# Patient Record
Sex: MALE | Race: WHITE | URBAN - METROPOLITAN AREA
[De-identification: names, ages, dates, MRNs, and addresses within clinical notes are randomized per-mention and may not be internally consistent; named-entity substitution may affect disease eponyms.]

---

## 2017-01-01 ENCOUNTER — TELEPHONE (OUTPATIENT)
Dept: FAMILY MEDICINE | Facility: CLINIC | Age: 82
End: 2017-01-01

## 2017-01-01 ENCOUNTER — NURSE TRIAGE (OUTPATIENT)
Dept: NURSING | Facility: CLINIC | Age: 82
End: 2017-01-01

## 2017-01-01 ENCOUNTER — INFUSION THERAPY VISIT (OUTPATIENT)
Dept: INFUSION THERAPY | Facility: CLINIC | Age: 82
End: 2017-01-01
Attending: INTERNAL MEDICINE
Payer: MEDICARE

## 2017-01-01 ENCOUNTER — TELEPHONE (OUTPATIENT)
Dept: NURSING | Facility: CLINIC | Age: 82
End: 2017-01-01

## 2017-01-01 ENCOUNTER — RADIANT APPOINTMENT (OUTPATIENT)
Dept: GENERAL RADIOLOGY | Facility: CLINIC | Age: 82
End: 2017-01-01
Attending: INTERNAL MEDICINE
Payer: COMMERCIAL

## 2017-01-01 ENCOUNTER — OFFICE VISIT (OUTPATIENT)
Dept: FAMILY MEDICINE | Facility: CLINIC | Age: 82
End: 2017-01-01
Payer: COMMERCIAL

## 2017-01-01 VITALS
OXYGEN SATURATION: 98 % | DIASTOLIC BLOOD PRESSURE: 69 MMHG | HEART RATE: 70 BPM | WEIGHT: 130.8 LBS | TEMPERATURE: 97.6 F | HEIGHT: 69 IN | BODY MASS INDEX: 19.37 KG/M2 | SYSTOLIC BLOOD PRESSURE: 116 MMHG

## 2017-01-01 VITALS — TEMPERATURE: 96.8 F | SYSTOLIC BLOOD PRESSURE: 122 MMHG | HEART RATE: 70 BPM | DIASTOLIC BLOOD PRESSURE: 70 MMHG

## 2017-01-01 DIAGNOSIS — D72.829 LEUKOCYTOSIS, UNSPECIFIED TYPE: ICD-10-CM

## 2017-01-01 DIAGNOSIS — F03.90 DEMENTIA WITHOUT BEHAVIORAL DISTURBANCE, UNSPECIFIED DEMENTIA TYPE: Primary | ICD-10-CM

## 2017-01-01 DIAGNOSIS — N17.9 ACUTE KIDNEY INJURY (H): ICD-10-CM

## 2017-01-01 DIAGNOSIS — R40.0 SOMNOLENCE: ICD-10-CM

## 2017-01-01 DIAGNOSIS — F03.90 DEMENTIA WITHOUT BEHAVIORAL DISTURBANCE, UNSPECIFIED DEMENTIA TYPE: ICD-10-CM

## 2017-01-01 DIAGNOSIS — I21.4 NSTEMI (NON-ST ELEVATED MYOCARDIAL INFARCTION) (H): ICD-10-CM

## 2017-01-01 DIAGNOSIS — M62.81 GENERALIZED MUSCLE WEAKNESS: Primary | ICD-10-CM

## 2017-01-01 DIAGNOSIS — I63.30 CEREBROVASCULAR ACCIDENT (CVA) DUE TO THROMBOSIS OF CEREBRAL ARTERY (H): ICD-10-CM

## 2017-01-01 DIAGNOSIS — E46 PROTEIN-CALORIE MALNUTRITION (H): ICD-10-CM

## 2017-01-01 DIAGNOSIS — N17.9 ACUTE KIDNEY INJURY (H): Primary | ICD-10-CM

## 2017-01-01 DIAGNOSIS — R63.0 DECREASED APPETITE: ICD-10-CM

## 2017-01-01 LAB
ALBUMIN SERPL-MCNC: 2.7 G/DL (ref 3.4–5)
ALBUMIN UR-MCNC: ABNORMAL MG/DL
ALP SERPL-CCNC: 301 U/L (ref 40–150)
ALT SERPL W P-5'-P-CCNC: 84 U/L (ref 0–70)
ANION GAP SERPL CALCULATED.3IONS-SCNC: 9 MMOL/L (ref 3–14)
APPEARANCE UR: CLEAR
AST SERPL W P-5'-P-CCNC: 52 U/L (ref 0–45)
BACTERIA #/AREA URNS HPF: ABNORMAL /HPF
BASOPHILS # BLD AUTO: 0 10E9/L (ref 0–0.2)
BASOPHILS NFR BLD AUTO: 0.2 %
BILIRUB SERPL-MCNC: 0.9 MG/DL (ref 0.2–1.3)
BILIRUB UR QL STRIP: NEGATIVE
BUN SERPL-MCNC: 65 MG/DL (ref 7–30)
CALCIUM SERPL-MCNC: 8.4 MG/DL (ref 8.5–10.1)
CASTS #/AREA URNS LPF: ABNORMAL /LPF (ref 0–2)
CHLORIDE SERPL-SCNC: 105 MMOL/L (ref 94–109)
CK SERPL-CCNC: 31 U/L (ref 30–300)
CO2 SERPL-SCNC: 24 MMOL/L (ref 20–32)
COLOR UR AUTO: YELLOW
COPATH REPORT: NORMAL
CREAT SERPL-MCNC: 1.97 MG/DL (ref 0.66–1.25)
DIFFERENTIAL METHOD BLD: ABNORMAL
EOSINOPHIL # BLD AUTO: 0.1 10E9/L (ref 0–0.7)
EOSINOPHIL NFR BLD AUTO: 0.3 %
ERYTHROCYTE [DISTWIDTH] IN BLOOD BY AUTOMATED COUNT: 14.4 % (ref 10–15)
GFR SERPL CREATININE-BSD FRML MDRD: 32 ML/MIN/1.7M2
GLUCOSE SERPL-MCNC: 178 MG/DL (ref 70–99)
GLUCOSE UR STRIP-MCNC: NEGATIVE MG/DL
HCT VFR BLD AUTO: 35.2 % (ref 40–53)
HGB BLD-MCNC: 11.5 G/DL (ref 13.3–17.7)
HGB UR QL STRIP: NEGATIVE
IMM GRANULOCYTES # BLD: 0.2 10E9/L (ref 0–0.4)
IMM GRANULOCYTES NFR BLD: 0.8 %
KETONES UR STRIP-MCNC: NEGATIVE MG/DL
LEUKOCYTE ESTERASE UR QL STRIP: NEGATIVE
LYMPHOCYTES # BLD AUTO: 1 10E9/L (ref 0.8–5.3)
LYMPHOCYTES NFR BLD AUTO: 5.2 %
MCH RBC QN AUTO: 32.5 PG (ref 26.5–33)
MCHC RBC AUTO-ENTMCNC: 32.7 G/DL (ref 31.5–36.5)
MCV RBC AUTO: 99 FL (ref 78–100)
MONOCYTES # BLD AUTO: 0.8 10E9/L (ref 0–1.3)
MONOCYTES NFR BLD AUTO: 3.9 %
NEUTROPHILS # BLD AUTO: 17.5 10E9/L (ref 1.6–8.3)
NEUTROPHILS NFR BLD AUTO: 89.6 %
NITRATE UR QL: NEGATIVE
NON-SQ EPI CELLS #/AREA URNS LPF: ABNORMAL /LPF
PH UR STRIP: 5 PH (ref 5–7)
PLATELET # BLD AUTO: 349 10E9/L (ref 150–450)
POTASSIUM SERPL-SCNC: 4.7 MMOL/L (ref 3.4–5.3)
PROT SERPL-MCNC: 7.3 G/DL (ref 6.8–8.8)
RBC # BLD AUTO: 3.54 10E12/L (ref 4.4–5.9)
RBC #/AREA URNS AUTO: ABNORMAL /HPF (ref 0–2)
SODIUM SERPL-SCNC: 138 MMOL/L (ref 133–144)
SP GR UR STRIP: 1.01 (ref 1–1.03)
TSH SERPL DL<=0.005 MIU/L-ACNC: 3.79 MU/L (ref 0.4–4)
URN SPEC COLLECT METH UR: ABNORMAL
UROBILINOGEN UR STRIP-ACNC: 0.2 EU/DL (ref 0.2–1)
WBC # BLD AUTO: 19.3 10E9/L (ref 4–11)
WBC #/AREA URNS AUTO: ABNORMAL /HPF (ref 0–2)

## 2017-01-01 PROCEDURE — 25000128 H RX IP 250 OP 636: Performed by: INTERNAL MEDICINE

## 2017-01-01 PROCEDURE — 71020 XR CHEST 2 VW: CPT

## 2017-01-01 PROCEDURE — 85004 AUTOMATED DIFF WBC COUNT: CPT | Performed by: INTERNAL MEDICINE

## 2017-01-01 PROCEDURE — 99215 OFFICE O/P EST HI 40 MIN: CPT | Performed by: INTERNAL MEDICINE

## 2017-01-01 PROCEDURE — 81001 URINALYSIS AUTO W/SCOPE: CPT | Performed by: INTERNAL MEDICINE

## 2017-01-01 PROCEDURE — 82550 ASSAY OF CK (CPK): CPT | Performed by: INTERNAL MEDICINE

## 2017-01-01 PROCEDURE — 80050 GENERAL HEALTH PANEL: CPT | Performed by: INTERNAL MEDICINE

## 2017-01-01 PROCEDURE — 85060 BLOOD SMEAR INTERPRETATION: CPT | Performed by: INTERNAL MEDICINE

## 2017-01-01 PROCEDURE — 96361 HYDRATE IV INFUSION ADD-ON: CPT

## 2017-01-01 PROCEDURE — 96360 HYDRATION IV INFUSION INIT: CPT

## 2017-01-01 PROCEDURE — 36415 COLL VENOUS BLD VENIPUNCTURE: CPT | Performed by: INTERNAL MEDICINE

## 2017-01-01 RX ORDER — MIRTAZAPINE 15 MG/1
15 TABLET, FILM COATED ORAL AT BEDTIME
Qty: 30 TABLET | Refills: 3 | Status: SHIPPED | OUTPATIENT
Start: 2017-01-01

## 2017-01-01 RX ORDER — ATORVASTATIN CALCIUM 40 MG/1
TABLET, FILM COATED ORAL
Qty: 30 TABLET | Refills: 5 | Status: SHIPPED | OUTPATIENT
Start: 2017-01-01

## 2017-01-01 RX ADMIN — SODIUM CHLORIDE 1000 ML: 9 INJECTION, SOLUTION INTRAVENOUS at 13:39

## 2017-06-21 NOTE — TELEPHONE ENCOUNTER
Atorvastatin         Last Written Prescription Date: 04/07/16  Last Fill Quantity: 90, # refills: 3    Last Office Visit with [facility], P or [facility] prescribing provider:  04/07/16   Future Office Visit:      BP Readings from Last 3 Encounters:   04/07/16 157/78   03/20/16 150/75   12/30/15 185/78     Lab Results   Component Value Date    ALT 26 03/19/2016     Lab Results   Component Value Date    CHOL 213 03/20/2016     Lab Results   Component Value Date    HDL 49 03/20/2016     Lab Results   Component Value Date     03/20/2016     Lab Results   Component Value Date    TRIG 176 03/20/2016     Lab Results   Component Value Date    CHOLHDLRATIO 3.7 10/08/2012

## 2017-06-27 NOTE — TELEPHONE ENCOUNTER
"Patients Wife reports:  Patient has Alzheimer's and can not hear anymore  Patient can not come in for appt, because he is very stubborn and has had his first bath in a year yesterday \"so you know what I'm going through\"  He won't come in unless he wants to and he does not want to    Patient is out of Atorvastatin  Please advise    Routing refill request to provider for review/approval because:  Labs not current:  FLP, ALT  Patient needs to be seen because it has been more than 1 year since last office visit.                "

## 2017-06-27 NOTE — TELEPHONE ENCOUNTER
Will fill x 6 months.  The standard of care is for all patients to be seen at least 1 x year, regardless of how stubborn they are

## 2017-07-30 NOTE — TELEPHONE ENCOUNTER
Daughter, Vero, calling regarding  concerns regarding her Dad. He has had increasing weakness and poor appetite.  She is concerned about his hydration status. Advised to go to ED. Vero is interested in getting home care set up for her Dad.  Please call Vero back on Monday at [phone] to discuss this option and how to get it started.  Routed to JARRED Gómez and care team .  Mariam Golden RN   [facility]

## 2017-07-30 NOTE — TELEPHONE ENCOUNTER
"Daughter calling about her Dad, Milly, to report increased generalized weakness in the last week.  Increasingly poor appetite. Has severe dementia, and is declining, Vero, daughter, worrying about   His hydration status, questioning getting an IV in the ED. Advised per guideline to be seen tonight in ED. Daughter is concerned about him being admitted.Last time he was admitted, he didn't do well, and   She wants to avoid that. She will try and get her Dad  To the ED. Message sent to PCP and care team regarding daughters interest in home care nursing visits.   Mariam Golden RN   [facility]    Reason for Disposition    [1] MODERATE weakness (i.e., interferes with work, school, normal activities) AND [2] cause unknown  (Exceptions: weakness with acute minor illness, or weakness from poor fluid intake)    Additional Information    Negative: Severe difficulty breathing (e.g., struggling for each breath, speaks in single words)    Negative: Shock suspected (e.g., cold/pale/clammy skin, too weak to stand, low BP, rapid pulse)    Negative: Difficult to awaken or acting confused  (e.g., disoriented, slurred speech)    Negative: [1] Fainted > 15 minutes ago AND [2] still feels too weak or dizzy to stand    Negative: [1] SEVERE weakness (i.e., unable to walk or barely able to walk, requires support) AND     [2] new onset or worsening    Negative: Sounds like a life-threatening emergency to the triager    Negative: Weakness of the face, arm or leg on one side of the body    Negative: [1] Known diabetic AND [2] weakness from low blood sugar (i.e., < 60 mg/dl or 3.5 mmol/l)    Negative: Heat exhaustion suspected (i.e., dehydration from heat exposure)    Negative: Vomiting is main symptom    Negative: Diarrhea is main symptom    Negative: Difficulty breathing    Negative: Heart beating < 50 beats per minute OR > 140 beats per minute    Negative: Extra heart beats OR irregular heart beating   (i.e., \"palpitations\")    " Negative: Follows bleeding (e.g., from vomiting, rectum, vagina; EXCEPTION: small brief weakness from sight of a small amount blood)    Negative: Black or tarry bowel movements    Protocols used: WEAKNESS (GENERALIZED) AND FATIGUE-ADULT-AH

## 2017-07-31 NOTE — TELEPHONE ENCOUNTER
Message has been left for daughter, Vero to return a call to the clinic. I have advised her to follow up with a clinic visit to discuss home care.     Franci Jara RN

## 2017-08-01 NOTE — TELEPHONE ENCOUNTER
I don't see a signed release of information for daughter. He needs a clinic visit to discuss anything further for home care. He hasn't been evaluated in over a year. They didn't go to ED as suggested by FNA (at least in our system). Wife answered, he doesn't talk on the phone. She states they just want to set up home care and requests someone come to set that up. I advised he needs a clinic visit first. We scheduled this appt for tomorrow with Dr. Buchanan. I advised if they had any emergent concerns to go to the ED as suggested by FNA. She states she understands and agrees with plan.      Asuncion Hurley RN

## 2017-08-02 PROBLEM — N17.9 ACUTE KIDNEY INJURY (H): Status: ACTIVE | Noted: 2017-01-01

## 2017-08-02 NOTE — PROGRESS NOTES
SUBJECTIVE:                                                    Milly Dyson is a 89 year old male who presents to clinic today for the following health issues:      Chief Complaint   Patient presents with     Consult     patient is here with wife and daughter - both have noticed that patient has been experiencing loss of appetite, weakness, doozing during the day to be more noticable over the past week; wants to discuss possibilities of dehydration, anemia, etc.?       Milly is a 89 year old man with advanced dementia who is brought in by his wife and daughter with the concern that he has been declining rapidly over the past couple of weeks.  He has been in an slow decline for 4 years.  They are having to force him to eat, though they feel that his liquid intake has been okay.  He is sleeping a lot.  He is more weak.  They've noticed a bit of running nose and that he is clearing his throat more recently.  No other changes noted.  He lives in a one story farm home with his wife, and their daughter visits when she can to help out, but she feels it would beneficial to start home care services.  They say he has a pretty good routine set up at home.  Daughter worries about her parents' safety crossing the road to their mailbox since they both move slowly and cars come up quickly and aren't easily visible from one direction and she requests a letter to provide to the postal service to request the mailbox be moved to the same side of the road as their house- letter provided.  They report he has not been having falls.    On my interview with Milly, I asked him who was accompanying him to the appointment and he answered with his sister's name.  He does answer my ROS questions, but it appears his answers are not reliable compared to what his family members report.  He asks random questions out of the blue, such as asking if the exam table in the room was a good purchase.       He is not taking the lisinopril, Plavix, or  "lorazepam that are on his medication list.  His daughter reports that he got unsteady and dizzy on the lisinopril.  He did have an NSTEMI, but this was attributed to severe AS rather than a coronary blockage, so Plavix is stopped.  They never gave him the lorazepam at home, which I think it good to avoid.       Problem list and histories reviewed & adjusted, as indicated.  Additional history: as documented    Current Outpatient Prescriptions   Medication Sig Dispense Refill     mirtazapine (REMERON) 15 MG tablet Take 1 tablet (15 mg) by mouth At Bedtime 30 tablet 3     atorvastatin (LIPITOR) 40 MG tablet TAKE ONE TABLET BY MOUTH ONE TIME DAILY (Patient taking differently: TAKE 1/2 TABLET BY MOUTH ONE TIME DAILY) 30 tablet 5     metoprolol (LOPRESSOR) 25 MG tablet Take 1 tablet (25 mg) by mouth 2 times daily 180 tablet 3     No Known Allergies    Reviewed and updated as needed this visit by clinical staff  Tobacco  Allergies  Med Hx  Surg Hx  Fam Hx  Soc Hx      Reviewed and updated as needed this visit by Provider         ROS:  Milly reports a negative ROS (constituational, pulm, GI, ), but doesn't appear to be a reliable history.        OBJECTIVE:   /69  Pulse 70  Temp 97.6  F (36.4  C) (Tympanic)  Ht 5' 9\" (1.753 m)  Wt 130 lb 12.8 oz (59.3 kg)  SpO2 98%  BMI 19.32 kg/m2  Body mass index is 19.32 kg/(m^2).  GENERAL: healthy, alert and no distress  HENT:nose and mouth without ulcers or lesions  RESP: lungs clear to auscultation - no rales, rhonchi or wheezes  CV: regular rate and rhythm, normal S1 S2, no S3 or S4, no murmur, click or rub, no peripheral edema   ABDOMEN: soft, nontender, bowel sounds normal    Diagnostic Test Results:  Results for orders placed or performed in visit on 08/02/17 (from the past 24 hour(s))   *UA reflex to Microscopic and Culture ([facility] (except [place] and [place])   Result Value Ref Range    Color Urine Yellow     Appearance Urine Clear     " Glucose Urine Negative NEG mg/dL    Bilirubin Urine Negative NEG    Ketones Urine Negative NEG mg/dL    Specific Gravity Urine 1.015 1.003 - 1.035    Blood Urine Negative NEG    pH Urine 5.0 5.0 - 7.0 pH    Protein Albumin Urine Trace (A) NEG mg/dL    Urobilinogen Urine 0.2 0.2 - 1.0 EU/dL    Nitrite Urine Negative NEG    Leukocyte Esterase Urine Negative NEG    Source Midstream Urine    Urine Microscopic   Result Value Ref Range    WBC Urine 2-5 (A) 0 - 2 /HPF    RBC Urine O - 2 0 - 2 /HPF    Cast Urine 0-2 0 - 2 /LPF    Squamous Epithelial /LPF Urine Few FEW /LPF    Bacteria Urine Few (A) NEG /HPF   Comprehensive metabolic panel   Result Value Ref Range    Sodium 138 133 - 144 mmol/L    Potassium 4.7 3.4 - 5.3 mmol/L    Chloride 105 94 - 109 mmol/L    Carbon Dioxide 24 20 - 32 mmol/L    Anion Gap 9 3 - 14 mmol/L    Glucose 178 (H) 70 - 99 mg/dL    Urea Nitrogen 65 (H) 7 - 30 mg/dL    Creatinine 1.97 (H) 0.66 - 1.25 mg/dL    GFR Estimate 32 (L) >60 mL/min/1.7m2    GFR Estimate If Black 39 (L) >60 mL/min/1.7m2    Calcium 8.4 (L) 8.5 - 10.1 mg/dL    Bilirubin Total 0.9 0.2 - 1.3 mg/dL    Albumin 2.7 (L) 3.4 - 5.0 g/dL    Protein Total 7.3 6.8 - 8.8 g/dL    Alkaline Phosphatase 301 (H) 40 - 150 U/L    ALT 84 (H) 0 - 70 U/L    AST 52 (H) 0 - 45 U/L   CBC with platelets   Result Value Ref Range    WBC 19.3 (H) 4.0 - 11.0 10e9/L    RBC Count 3.54 (L) 4.4 - 5.9 10e12/L    Hemoglobin 11.5 (L) 13.3 - 17.7 g/dL    Hematocrit 35.2 (L) 40.0 - 53.0 %    MCV 99 78 - 100 fl    MCH 32.5 26.5 - 33.0 pg    MCHC 32.7 31.5 - 36.5 g/dL    RDW 14.4 10.0 - 15.0 %    Platelet Count 349 150 - 450 10e9/L   TSH with free T4 reflex   Result Value Ref Range    TSH 3.79 0.40 - 4.00 mU/L   CK total   Result Value Ref Range    CK Total 31 30 - 300 U/L   Differential   Result Value Ref Range    Diff Method Automated Method     % Neutrophils 89.6 %    % Lymphocytes 5.2 %    % Monocytes 3.9 %    % Eosinophils 0.3 %    % Basophils 0.2 %    % Immature  Granulocytes 0.8 %    Absolute Neutrophil 17.5 (H) 1.6 - 8.3 10e9/L    Absolute Lymphocytes 1.0 0.8 - 5.3 10e9/L    Absolute Monocytes 0.8 0.0 - 1.3 10e9/L    Absolute Eosinophils 0.1 0.0 - 0.7 10e9/L    Absolute Basophils 0.0 0.0 - 0.2 10e9/L    Abs Immature Granulocytes 0.2 0 - 0.4 10e9/L   XR Chest 2 Views    Narrative    XR CHEST 2 VW 8/2/2017 12:53 PM    HISTORY: Somnolence. Elevated white blood cell count.    COMPARISON: Several prior studies, the most recent one being dated  10/15/2012.      Impression    IMPRESSION: 2 views of the chest show no acute cardiopulmonary  disease. The patient is again shown to be status post median  sternotomy and cardiac surgery.     WANDA HOLMAN MD       ASSESSMENT/PLAN:         1. Generalized muscle weakness  2. Somnolence  3. Leukocytosis, unspecified type  4. Decreased appetite  5. Dementia without behavioral disturbance, unspecified dementia type  6. Protein-calorie malnutrition (H)  7. Acute kidney injury (H)    Milly presents with somnolence, decreased appetite, and weakness.  May be due to progression of dementia.  Labs show an elevated WBC, but I don't see clinical evidence of infection, CXR is clear, and U/A is not convincing for infection.  I am therefore suspicious of a blood malignancy- discussed this with family and we will proceed with peripheral smear for diagnosis, but they would not elect to treat a cancer if present (Milly is not decisional due to advanced dementia, but wife is primary POA and daughter is secondary).  Creatinine has increased, which may represent ALECIA due to poor PO intake, or may be progression of CKD- creatinine has not been checked for over a year, so difficult to say how acute this is.  They would like to proceed with IVF infusion to see if that makes him feel better symptomatically.  His albumin is low indicating malnutrition since he hasn't been eating well.  Recommend trial of mirtazapine for appetite stimulation.  I agree that home care  would be helpful, and have placed a referral.  Follow-up pending further results.      - Comprehensive metabolic panel  - CBC with platelets  - *UA reflex to Microscopic and Culture ([facility] (except [place] and [place])  - TSH with free T4 reflex  - CK total  - Urine Microscopic  - Differential  - XR Chest 2 Views  - Blood Morphology Pathologist Review  - mirtazapine (REMERON) 15 MG tablet; Take 1 tablet (15 mg) by mouth At Bedtime  Dispense: 30 tablet; Refill: 3  - HOME CARE NURSING REFERRAL    More than 50% of this 40 minute face-to-face appointment was spent on counseling and coordination of care of all of the above.      Bear Buchanan MD  [facility]

## 2017-08-02 NOTE — MR AVS SNAPSHOT
"              After Visit Summary   2017    Milly Dyson    MRN: [id number]           Patient Information     Date Of Birth          1926        Visit Information        Provider Department      2017 1:30 PM ROOM 3 [facility]        Today's Diagnoses     Acute kidney injury (H)    -  1       Follow-ups after your visit        Who to contact     If you have questions or need follow up information about today's clinic visit or your schedule please contact [facility] directly at [phone].  Normal or non-critical lab and imaging results will be communicated to you by [employer], letter or phone within 4 business days after the clinic has received the results. If you do not hear from us within 7 days, please contact the clinic through [employer] or phone. If you have a critical or abnormal lab result, we will notify you by phone as soon as possible.  Submit refill requests through [employer] or call your pharmacy and they will forward the refill request to us. Please allow 3 business days for your refill to be completed.          Additional Information About Your Visit        MyChart Information     [employer] lets you send messages to your doctor, view your test results, renew your prescriptions, schedule appointments and more. To sign up, go to [email or web address] . Click on \"Log in\" on the left side of the screen, which will take you to the Welcome page. Then click on \"Sign up Now\" on the right side of the page.     You will be asked to enter the access code listed below, as well as some personal information. Please follow the directions to create your username and password.     Your access code is: [email or web address]  Expires: 10/31/2017  1:07 PM     Your access code will  in 90 days. If you need help or a new code, please call your [facility] clinic or [phone].        Care EveryWhere ID     This is your Care EveryWhere ID. This could be used by other organizations to access " your [facility] medical records  [id number]        Your Vitals Were     Pulse Temperature                70 96.8  F (36  C)           Blood Pressure from Last 3 Encounters:   08/02/17 122/70   08/02/17 116/69   04/07/16 157/78    Weight from Last 3 Encounters:   08/02/17 59.3 kg (130 lb 12.8 oz)   04/07/16 62.1 kg (137 lb)   03/20/16 60.2 kg (132 lb 11.5 oz)              Today, you had the following     No orders found for display         Today's Medication Changes          These changes are accurate as of: 8/2/17  3:34 PM.  If you have any questions, ask your nurse or doctor.               Start taking these medicines.        Dose/Directions    mirtazapine 15 MG tablet   Commonly known as:  REMERON   Used for:  Decreased appetite   Started by:  Bear Buchanan MD        Dose:  15 mg   Take 1 tablet (15 mg) by mouth At Bedtime   Quantity:  30 tablet   Refills:  3         These medicines have changed or have updated prescriptions.        Dose/Directions    atorvastatin 40 MG tablet   Commonly known as:  LIPITOR   This may have changed:  See the new instructions.   Used for:  Cerebrovascular accident (CVA) due to thrombosis of cerebral artery (H), NSTEMI (non-ST elevated myocardial infarction) (H)        TAKE ONE TABLET BY MOUTH ONE TIME DAILY   Quantity:  30 tablet   Refills:  5         Stop taking these medicines if you haven't already. Please contact your care team if you have questions.     clopidogrel 75 MG tablet   Commonly known as:  PLAVIX   Stopped by:  Bear Buchanan MD           lisinopril 10 MG tablet   Commonly known as:  PRINIVIL/ZESTRIL   Stopped by:  Bear Buchanan MD           LORazepam 0.5 MG tablet   Commonly known as:  ATIVAN   Stopped by:  Bear Buchanan MD                Where to get your medicines      These medications were sent to [facility] [place], [place] - [address]  [address] [address]     Phone:  [phone]     mirtazapine 15 MG tablet                 Primary Care Provider Office Phone # Fax #    Shavon Gómez [phone]       [facility] [address] [address]        Equal Access to Services     RICHIE GUERRIER : Hadii aad ku hadmaudeo Sorekhaali, waaxda luqadaha, qaybta kaalmada adeegyada, ronni campbellrandy salterashish salmon mariam ashford. So [facility] [phone].    ATENCIÓN: Si habla español, tiene a yepez disposición servicios gratuitos de asistencia lingüística. Llame al [phone].    We comply with applicable federal civil rights laws and [place] laws. We do not discriminate on the basis of race, color, national origin, age, disability sex, sexual orientation or gender identity.            Thank you!     Thank you for choosing [facility]  for your care. Our goal is always to provide you with excellent care. Hearing back from our patients is one way we can continue to improve our services. Please take a few minutes to complete the written survey that you may receive in the mail after your visit with us. Thank you!             Your Updated Medication List - Protect others around you: Learn how to safely use, store and throw away your medicines at www.disposemymeds.org.          This list is accurate as of: 8/2/17  3:34 PM.  Always use your most recent med list.                   Brand Name Dispense Instructions for use Diagnosis    atorvastatin 40 MG tablet    LIPITOR    30 tablet    TAKE ONE TABLET BY MOUTH ONE TIME DAILY    Cerebrovascular accident (CVA) due to thrombosis of cerebral artery (H), NSTEMI (non-ST elevated myocardial infarction) (H)       metoprolol 25 MG tablet    LOPRESSOR    180 tablet    Take 1 tablet (25 mg) by mouth 2 times daily    S/P AVR (aortic valve replacement), NSTEMI (non-ST elevated myocardial infarction) (H)       mirtazapine 15 MG tablet    REMERON    30 tablet    Take 1 tablet (15 mg) by mouth At Bedtime    Decreased appetite

## 2017-08-02 NOTE — LETTER
[facility]  [address] [address]  Phone: [phone]    August 2, 2017        Milly Dyson  [address] [place] [address]             Post master of [place],       Please consider moving the Kiel's mailbox to the same side of road as their house rather than across the street. They are physically slow, elderly and need it moved for safety reason since they are having trouble crossing the road.      Sincerely,        Bear Buchanan MD

## 2017-08-02 NOTE — MR AVS SNAPSHOT
After Visit Summary   8/2/2017    Milly Dyson    MRN: [id number]           Patient Information     Date Of Birth          1/6/1926        Visit Information        Provider Department      8/2/2017 11:20 AM Bear Buchanan MD [facility]        Today's Diagnoses     Generalized muscle weakness    -  1    Somnolence        Leukocytosis, unspecified type        Decreased appetite          Care Instructions    We will have the blood specialist look at the blood cells under the microscope to check for cancer.    We'll arrange an IV fluid infusion to help with hydration.      We'll try the mirtazapine to stimulate appetite- take in the evening since it can cause drowsiness.      I'll order home care and they should contact you.        Thank you for choosing [facility].  You may be receiving a survey in the mail from Joel Cuellar regarding your visit today.  Please take a few minutes to complete and return the survey to let us know how we are doing.      If you have questions or concerns, please contact us via [employer] or you can contact your care team at [phone].    Our Clinic hours are:  Monday 6:40 am  to 7:00 pm  Tuesday -Friday 6:40 am to 5:00 pm    The [place] outpatient lab hours are:  Monday - Friday 6:10 am to 4:45 pm  Saturdays 7:00 am to 11:00 am  Appointments are required, call [phone]    If you have clinical questions after hours or would like to schedule an appointment,  call the clinic at [phone].          Follow-ups after your visit        Who to contact     If you have questions or need follow up information about today's clinic visit or your schedule please contact [facility] directly at [phone].  Normal or non-critical lab and imaging results will be communicated to you by MyChart, letter or phone within 4 business days after the clinic has received the results. If you do not hear from us within 7 days, please contact the clinic through  "My 1%hart or phone. If you have a critical or abnormal lab result, we will notify you by phone as soon as possible.  Submit refill requests through [employer] or call your pharmacy and they will forward the refill request to us. Please allow 3 business days for your refill to be completed.          Additional Information About Your Visit        My 1%hart Information     [employer] lets you send messages to your doctor, view your test results, renew your prescriptions, schedule appointments and more. To sign up, go to [email or web address] . Click on \"Log in\" on the left side of the screen, which will take you to the Welcome page. Then click on \"Sign up Now\" on the right side of the page.     You will be asked to enter the access code listed below, as well as some personal information. Please follow the directions to create your username and password.     Your access code is: [email or web address]  Expires: 10/31/2017  1:07 PM     Your access code will  in 90 days. If you need help or a new code, please call your [facility] clinic or [phone].        Care EveryWhere ID     This is your Care EveryWhere ID. This could be used by other organizations to access your [facility] medical records  [id number]        Your Vitals Were     Pulse Temperature Height Pulse Oximetry BMI (Body Mass Index)       70 97.6  F (36.4  C) (Tympanic) 5' 9\" (1.753 m) 98% 19.32 kg/m2        Blood Pressure from Last 3 Encounters:   17 116/69   16 157/78   16 150/75    Weight from Last 3 Encounters:   17 130 lb 12.8 oz (59.3 kg)   16 137 lb (62.1 kg)   16 132 lb 11.5 oz (60.2 kg)              We Performed the Following     *UA reflex to Microscopic and Culture ([place] and [facility] (except [place] and [place])     CBC with platelets     CK total     Comprehensive metabolic panel     Differential     TSH with free T4 reflex     Urine Microscopic     XR Chest 2 Views          Today's Medication Changes        "   These changes are accurate as of: 8/2/17  1:07 PM.  If you have any questions, ask your nurse or doctor.               Start taking these medicines.        Dose/Directions    mirtazapine 15 MG tablet   Commonly known as:  REMERON   Used for:  Decreased appetite   Started by:  Bear Buchanan MD        Dose:  15 mg   Take 1 tablet (15 mg) by mouth At Bedtime   Quantity:  30 tablet   Refills:  3         These medicines have changed or have updated prescriptions.        Dose/Directions    atorvastatin 40 MG tablet   Commonly known as:  LIPITOR   This may have changed:  See the new instructions.   Used for:  Cerebrovascular accident (CVA) due to thrombosis of cerebral artery (H), NSTEMI (non-ST elevated myocardial infarction) (H)        TAKE ONE TABLET BY MOUTH ONE TIME DAILY   Quantity:  30 tablet   Refills:  5         Stop taking these medicines if you haven't already. Please contact your care team if you have questions.     LORazepam 0.5 MG tablet   Commonly known as:  ATIVAN   Stopped by:  Bear Buchanan MD                Where to get your medicines      These medications were sent to [facility] [place] [address]     Phone:  [phone]     mirtazapine 15 MG tablet                Primary Care Provider Office Phone # Fax #    Shavon GómzeDO [phone]       [place] [address]        Equal Access to Services     RICHIE GUERRIER : Sherif ingram Soartem, waaxda luqadaha, qaybta kaalmada redd, ronni ashford. So [facility] [phone].    ATENCIÓN: Si habla español, tiene a yepez disposición servicios gratuitos de asistencia lingüística. Niesha al [phone].    We comply with applicable federal civil rights laws and [place] laws. We do not discriminate on the basis of race, color, national origin, age, disability sex, sexual orientation or gender  identity.            Thank you!     Thank you for choosing [facility]  for your care. Our goal is always to provide you with excellent care. Hearing back from our patients is one way we can continue to improve our services. Please take a few minutes to complete the written survey that you may receive in the mail after your visit with us. Thank you!             Your Updated Medication List - Protect others around you: Learn how to safely use, store and throw away your medicines at www.disposemymeds.org.          This list is accurate as of: 8/2/17  1:07 PM.  Always use your most recent med list.                   Brand Name Dispense Instructions for use Diagnosis    atorvastatin 40 MG tablet    LIPITOR    30 tablet    TAKE ONE TABLET BY MOUTH ONE TIME DAILY    Cerebrovascular accident (CVA) due to thrombosis of cerebral artery (H), NSTEMI (non-ST elevated myocardial infarction) (H)       clopidogrel 75 MG tablet    PLAVIX    90 tablet    Take 1 tablet (75 mg) by mouth daily    Cerebrovascular accident (CVA) due to thrombosis of cerebral artery (H)       lisinopril 10 MG tablet    PRINIVIL/ZESTRIL    90 tablet    Take 1 tablet (10 mg) by mouth daily    Essential hypertension       metoprolol 25 MG tablet    LOPRESSOR    180 tablet    Take 1 tablet (25 mg) by mouth 2 times daily    S/P AVR (aortic valve replacement), NSTEMI (non-ST elevated myocardial infarction) (H)       mirtazapine 15 MG tablet    REMERON    30 tablet    Take 1 tablet (15 mg) by mouth At Bedtime    Decreased appetite

## 2017-08-02 NOTE — NURSING NOTE
"Chief Complaint   Patient presents with     Consult     patient is here with wife and daughter - both have noticed that patient has been experiencing loss of appetite, weakness, doozing during the day to be more noticable over the past week; wants to discuss possibilities of dehydration, anemia, etc.?       Initial /69  Pulse 70  Temp 97.6  F (36.4  C) (Tympanic)  Ht 5' 9\" (1.753 m)  Wt 130 lb 12.8 oz (59.3 kg)  SpO2 98%  BMI 19.32 kg/m2 Estimated body mass index is 19.32 kg/(m^2) as calculated from the following:    Height as of this encounter: 5' 9\" (1.753 m).    Weight as of this encounter: 130 lb 12.8 oz (59.3 kg).  Medication Reconciliation: complete  "

## 2017-08-02 NOTE — PATIENT INSTRUCTIONS
We will have the blood specialist look at the blood cells under the microscope to check for cancer.    We'll arrange an IV fluid infusion to help with hydration.      We'll try the mirtazapine to stimulate appetite- take in the evening since it can cause drowsiness.      I'll order home care and they should contact you.        Thank you for choosing [facility].  You may be receiving a survey in the mail from [employer] regarding your visit today.  Please take a few minutes to complete and return the survey to let us know how we are doing.      If you have questions or concerns, please contact us via [employer] or you can contact your care team at [phone].    Our Clinic hours are:  Monday 6:40 am  to 7:00 pm  Tuesday -Friday 6:40 am to 5:00 pm    The [place] outpatient lab hours are:  Monday - Friday 6:10 am to 4:45 pm  Saturdays 7:00 am to 11:00 am  Appointments are required, call [phone]    If you have clinical questions after hours or would like to schedule an appointment,  call the clinic at [phone].

## 2017-08-02 NOTE — PROGRESS NOTES
Infusion Nursing Note:  Milly Dyson presents today for IVF.    Patient seen by provider today: Yes: Dewayne   present during visit today: Not Applicable.    Note: N/A.    Intravenous Access:  Peripheral IV placed.    Treatment Conditions:  Liter NS given per order.        Post Infusion Assessment:  Patient tolerated infusion without incident.  Access discontinued per protocol.    Discharge Plan:   Patient discharged in stable condition accompanied by: wife and daughter.  Departure Mode: Wheelchair.    Shannan Garcia RN

## 2017-08-04 NOTE — TELEPHONE ENCOUNTER
S-(situation): This SN will call back to follow up with the family on Monday 8/7/17 regarding home care.    B-(background): A referral was received by [facility] ([facility]) on 8/2/17.    A-(assessment): This SN called and spoke with the patient's spouse on 8/3/17. She stated she wanted to talk to her daughter more about services and would call back. This SN stated that [facility] will follow up on Monday 8/7 if no call has been received yet. She verbalized agreement with the plan.    R-(recommendations): This SN will call the family on 8/7/17. Once a plan is in place regarding the family wanting home care or declining, this SN will notify the provider. Thank you for the referral!

## 2017-08-08 NOTE — TELEPHONE ENCOUNTER
This SN left a message for the family 8/7 and spoke with the patient's spouse Destini today 8/8/17. They have decided to not have a home care assessment for private pay home health aide services at this time and instead for the family to help the patient to meet his current needs. They declined a home care assessment. Thank you for the referral! Jamila Bedolla, ADRIAN - Intake, [facility] [phone]

## 2017-08-14 NOTE — TELEPHONE ENCOUNTER
Dr Buchanan, I called and they would like a hospice specific order entered into [employer], please. (see note below)  And thank you.   Yanique Panda RNC

## 2017-08-14 NOTE — TELEPHONE ENCOUNTER
Reason for Call: Request for an order or referral:    Order or referral being requested: Hospice Assessment    Date needed: as soon as possible    Has the patient been seen by the PCP for this problem? YES    Additional comments: Family has decided it is time for a Hospice Assessment    Phone number Patient can be reached at:  Other phone number:  Sarah @ [facility] [phone]    Best Time:  any    Can we leave a detailed message on this number?  YES   Milvia Goode  [facility] [place] [facility]      Call taken on 8/14/2017 at 10:47 AM by Milvia Goode

## 2017-08-17 PROBLEM — Z51.5 HOSPICE CARE PATIENT: Status: ACTIVE | Noted: 2017-01-01

## 2017-08-18 NOTE — TELEPHONE ENCOUNTER
Reason for Call:  Other call back    Detailed comments: Hospice needs a diagnosis from the doctor of Alzheimers for patient to qualify under the insurance.    Phone Number Patient can be reached at: Other phone number:  [phone]    Best Time: any    Can we leave a detailed message on this number? YES    Call taken on 8/18/2017 at 8:10 AM by Sophie Siddiqui

## 2017-08-18 NOTE — TELEPHONE ENCOUNTER
He hasn't had formal neuropsych testing to state exactly what type of dementia he has.  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 alzheimers is one possibility, but he has had stroke, so it could be vascular dementia.

## 2017-08-28 ENCOUNTER — TELEPHONE (OUTPATIENT)
Dept: FAMILY MEDICINE | Facility: CLINIC | Age: 82
End: 2017-08-28

## 2017-09-07 ENCOUNTER — TELEPHONE (OUTPATIENT)
Dept: FAMILY MEDICINE | Facility: CLINIC | Age: 82
End: 2017-09-07

## 2017-09-07 NOTE — TELEPHONE ENCOUNTER
JACK signed, faxed and sent to scanning.    [facility]